# Patient Record
Sex: MALE | Race: BLACK OR AFRICAN AMERICAN | Employment: OTHER | URBAN - NONMETROPOLITAN AREA
[De-identification: names, ages, dates, MRNs, and addresses within clinical notes are randomized per-mention and may not be internally consistent; named-entity substitution may affect disease eponyms.]

---

## 2024-06-15 ENCOUNTER — HOSPITAL ENCOUNTER (EMERGENCY)
Facility: HOSPITAL | Age: 74
Discharge: HOME | End: 2024-06-15
Attending: EMERGENCY MEDICINE
Payer: MEDICARE

## 2024-06-15 VITALS
DIASTOLIC BLOOD PRESSURE: 87 MMHG | BODY MASS INDEX: 30.07 KG/M2 | HEIGHT: 72 IN | TEMPERATURE: 97.8 F | WEIGHT: 222 LBS | SYSTOLIC BLOOD PRESSURE: 142 MMHG | HEART RATE: 58 BPM | OXYGEN SATURATION: 100 % | RESPIRATION RATE: 16 BRPM

## 2024-06-15 DIAGNOSIS — M43.6 TORTICOLLIS: Primary | ICD-10-CM

## 2024-06-15 PROCEDURE — 6360000002 HC RX W HCPCS: Performed by: EMERGENCY MEDICINE

## 2024-06-15 PROCEDURE — 96372 THER/PROPH/DIAG INJ SC/IM: CPT

## 2024-06-15 PROCEDURE — 99284 EMERGENCY DEPT VISIT MOD MDM: CPT

## 2024-06-15 RX ORDER — KETOROLAC TROMETHAMINE 15 MG/ML
15 INJECTION, SOLUTION INTRAMUSCULAR; INTRAVENOUS
Status: COMPLETED | OUTPATIENT
Start: 2024-06-15 | End: 2024-06-15

## 2024-06-15 RX ADMIN — KETOROLAC TROMETHAMINE 15 MG: 15 INJECTION, SOLUTION INTRAMUSCULAR; INTRAVENOUS at 02:41

## 2024-06-15 ASSESSMENT — PAIN DESCRIPTION - ORIENTATION
ORIENTATION: RIGHT
ORIENTATION: RIGHT

## 2024-06-15 ASSESSMENT — PAIN DESCRIPTION - DESCRIPTORS: DESCRIPTORS: ACHING;TIGHTNESS

## 2024-06-15 ASSESSMENT — PAIN DESCRIPTION - PAIN TYPE
TYPE: ACUTE PAIN
TYPE: ACUTE PAIN

## 2024-06-15 ASSESSMENT — PAIN DESCRIPTION - LOCATION
LOCATION: NECK
LOCATION: NECK

## 2024-06-15 ASSESSMENT — PAIN SCALES - GENERAL
PAINLEVEL_OUTOF10: 9
PAINLEVEL_OUTOF10: 9

## 2024-06-15 ASSESSMENT — LIFESTYLE VARIABLES
HOW OFTEN DO YOU HAVE A DRINK CONTAINING ALCOHOL: NEVER
HOW MANY STANDARD DRINKS CONTAINING ALCOHOL DO YOU HAVE ON A TYPICAL DAY: PATIENT DOES NOT DRINK

## 2024-06-15 ASSESSMENT — PAIN DESCRIPTION - FREQUENCY: FREQUENCY: CONTINUOUS

## 2024-06-15 ASSESSMENT — ENCOUNTER SYMPTOMS
EYES NEGATIVE: 1
GASTROINTESTINAL NEGATIVE: 1
ALLERGIC/IMMUNOLOGIC NEGATIVE: 1
RESPIRATORY NEGATIVE: 1

## 2024-06-15 ASSESSMENT — PAIN - FUNCTIONAL ASSESSMENT
PAIN_FUNCTIONAL_ASSESSMENT: PREVENTS OR INTERFERES WITH MANY ACTIVE NOT PASSIVE ACTIVITIES
PAIN_FUNCTIONAL_ASSESSMENT: PREVENTS OR INTERFERES SOME ACTIVE ACTIVITIES AND ADLS
PAIN_FUNCTIONAL_ASSESSMENT: 0-10
PAIN_FUNCTIONAL_ASSESSMENT: 0-10

## 2024-06-15 ASSESSMENT — PAIN DESCRIPTION - ONSET: ONSET: AWAKENED FROM SLEEP

## 2024-06-15 NOTE — ED PROVIDER NOTES
[facility] EMERGENCY DEPT  EMERGENCY DEPARTMENT ENCOUNTER      Pt Name: Bowen Tiwari  MRN: [id number]  Birthdate 1950  Date of evaluation: 6/15/2024  Provider: Fiona Cantu MD  2:15 AM    CHIEF COMPLAINT     No chief complaint on file.        HISTORY OF PRESENT ILLNESS    Bowen Tiwari is a 73 y.o. male who presents to the emergency department neck pain on right side     HPI: woke up with right neck unable to move it to the right without pain.  Patient believes he slept wrong.  Patient had a previous episode was given muscle relaxant.  He took 2 of them yesterday.  No recent trauma no upper respiratory tract infection coughing fevers or chills.    Nursing Notes were reviewed.    REVIEW OF SYSTEMS       Review of Systems   Constitutional: Negative.    HENT: Negative.     Eyes: Negative.    Respiratory: Negative.     Cardiovascular: Negative.    Gastrointestinal: Negative.    Endocrine: Negative.    Genitourinary: Negative.    Musculoskeletal:  Positive for neck pain.   Skin: Negative.    Allergic/Immunologic: Negative.    Neurological: Negative.    Hematological: Negative.    Psychiatric/Behavioral: Negative.     All other systems reviewed and are negative.      Except as noted above the remainder of the review of systems was reviewed and negative.       PAST MEDICAL HISTORY   No past medical history on file.      SURGICAL HISTORY     No past surgical history on file.      CURRENT MEDICATIONS       Previous Medications    No medications on file       ALLERGIES     Patient has no allergy information on record.    FAMILY HISTORY     No family history on file.       SOCIAL HISTORY          SCREENINGS                                                 PHYSICAL EXAM       ED Triage Vitals   BP Temp Temp src Pulse Resp SpO2 Height Weight   -- -- -- -- -- -- -- --       Physical Exam  Vitals and nursing note reviewed.   Constitutional:       Appearance: Normal appearance. He is normal weight.   HENT:      Head:

## 2024-06-15 NOTE — DISCHARGE INSTRUCTIONS
Take medication as prescribed.  Moist warm compressor to affected.  Gradually slowly progressive exercising of the neck.  Follow-up with primary care doctor on Monday or return to the ED if symptoms get worse, Motrin or Tylenol as directed for pain.

## 2024-06-15 NOTE — ED TRIAGE NOTES
Pt states he woke up yesterday with R sided neck pain. Difficulty moving neck side to side. Denies recent injury